# Patient Record
Sex: FEMALE | Race: WHITE | NOT HISPANIC OR LATINO | Employment: UNEMPLOYED | URBAN - METROPOLITAN AREA
[De-identification: names, ages, dates, MRNs, and addresses within clinical notes are randomized per-mention and may not be internally consistent; named-entity substitution may affect disease eponyms.]

---

## 2020-09-02 DIAGNOSIS — M79.671 BILATERAL FOOT PAIN: Primary | ICD-10-CM

## 2020-09-02 DIAGNOSIS — M79.672 BILATERAL FOOT PAIN: Primary | ICD-10-CM

## 2020-09-03 ENCOUNTER — OFFICE VISIT (OUTPATIENT)
Dept: ORTHOPEDICS | Facility: CLINIC | Age: 50
End: 2020-09-03
Payer: COMMERCIAL

## 2020-09-03 ENCOUNTER — HOSPITAL ENCOUNTER (OUTPATIENT)
Dept: RADIOLOGY | Facility: HOSPITAL | Age: 50
Discharge: HOME | End: 2020-09-03
Attending: ORTHOPAEDIC SURGERY
Payer: COMMERCIAL

## 2020-09-03 VITALS
HEART RATE: 86 BPM | SYSTOLIC BLOOD PRESSURE: 124 MMHG | DIASTOLIC BLOOD PRESSURE: 76 MMHG | BODY MASS INDEX: 26.21 KG/M2 | WEIGHT: 147.94 LBS | HEIGHT: 63 IN

## 2020-09-03 DIAGNOSIS — M20.10 HALLUX VALGUS WITH BUNIONS, UNSPECIFIED LATERALITY: Primary | ICD-10-CM

## 2020-09-03 DIAGNOSIS — M79.671 BILATERAL FOOT PAIN: ICD-10-CM

## 2020-09-03 DIAGNOSIS — M21.619 HALLUX VALGUS WITH BUNIONS, UNSPECIFIED LATERALITY: Primary | ICD-10-CM

## 2020-09-03 DIAGNOSIS — M79.672 BILATERAL FOOT PAIN: ICD-10-CM

## 2020-09-03 DIAGNOSIS — Z01.818 PREOP TESTING: ICD-10-CM

## 2020-09-03 PROCEDURE — 73630 XR FOOT COMPLETE 3 VIEW BILATERAL: ICD-10-PCS | Mod: 26,50,, | Performed by: RADIOLOGY

## 2020-09-03 PROCEDURE — 73630 X-RAY EXAM OF FOOT: CPT | Mod: TC,50,PO

## 2020-09-03 PROCEDURE — 99244 PR OFFICE CONSULTATION,LEVEL IV: ICD-10-PCS | Mod: S$GLB,,, | Performed by: ORTHOPAEDIC SURGERY

## 2020-09-03 PROCEDURE — 99999 PR PBB SHADOW E&M-EST. PATIENT-LVL III: CPT | Mod: PBBFAC,,, | Performed by: ORTHOPAEDIC SURGERY

## 2020-09-03 PROCEDURE — 73630 X-RAY EXAM OF FOOT: CPT | Mod: 26,50,, | Performed by: RADIOLOGY

## 2020-09-03 PROCEDURE — 99244 OFF/OP CNSLTJ NEW/EST MOD 40: CPT | Mod: S$GLB,,, | Performed by: ORTHOPAEDIC SURGERY

## 2020-09-03 PROCEDURE — 99999 PR PBB SHADOW E&M-EST. PATIENT-LVL III: ICD-10-PCS | Mod: PBBFAC,,, | Performed by: ORTHOPAEDIC SURGERY

## 2020-09-03 RX ORDER — TERBINAFINE HYDROCHLORIDE 250 MG/1
250 TABLET ORAL DAILY
COMMUNITY
End: 2021-07-21

## 2020-09-03 NOTE — PROGRESS NOTES
"HPI: Renata Bae is a  49 y.o. female  who was referred to me by Dr. Katherine Fofana and was seen in consultation today for bilateral foot pain, left is worse than the right.  No history of injury or trauma. The pain has been present and worsening for several years. She rates her pain as 5/10 today. The pain is worse with shoe wear and activity.   Pt denies weakness, numbness, and tingling.          PAST MEDICAL/SURGICAL/FAMILY/SOCIAL/ HISTORY: REVIEWED    ALLERGIES/MEDICATIONS: REVIEWED       Review of Systems:     Constitution: Negative.   HEENT: Negative.   Eyes: Negative.   Cardiovascular: Negative.   Respiratory: Negative.   Endocrine: Negative.   Hematologic/Lymphatic: Negative.   Skin: Negative.   Musculoskeletal: Positive for bilateral foot pain   Gastrointestinal: Negative.   Genitourinary: Negative.   Neurological: Negative.   Psychiatric/Behavioral: Negative.   Allergic/Immunologic: Negative.       PHYSICAL EXAM:  Vitals:    09/03/20 1347   BP: 124/76   Pulse: 86     Ht Readings from Last 1 Encounters:   09/03/20 5' 3" (1.6 m)     Wt Readings from Last 1 Encounters:   09/03/20 67.1 kg (147 lb 14.9 oz)       GENERAL: Well developed, well nourished, no acute distress. Very pleasant. ++Generalized ligamentous laxity.   SKIN: Skin is intact. No atrophy, abrasions or lesions are noted.   Neurological: Normal mental status. Appropriate and conversant. Alert and oriented x 3.  GAIT: Walks with non-antalgic gait.    Left  lower extremity compared with RLE:  2+ dorsalis pedis pulse.  Capillary refill < 3 seconds.  Normal range of motion tibiotalar and subtalar joints. Pes planus.  5/5 strength EHL, FHL, tibialis anterior, gastrocsoleus, tibialis posterior and peroneals. Sensation to light touch intact sural, saphenous, superficial peroneal and deep peroneal nerves. + swelling and  tenderness to palpation at the 1st mptj with large bunions and hallux valgus, L>>R. No ecchymosis. No lymphadenopathy, no " masses or tumors palpated.  Increased of the 1st mtpj. + increased motion at the 1st tmtj.     XRAYS:   3 views of bilateral feet obtained and reviewed today reveal bilateral hallux valgus with bunions. Left HVA 37.1  THA 17.7 , Right HVA 26  THA 12      ASSESSMENT: Bilateral hallux valgus with bunions      PLAN:  I spent 45 minutes in consulation with the patient today. More than half the time was spent counseling the patient on her condition and the options for operative versus non-operative care.    We discussed surgical treatment  for the patient's condition at length and in detail including post-operative course, complications, outcomes and prognosis using diagrams and models where appropriate. I recommend Left distal soft tissue release, bunionectomy and Lapidus fusion.  Informed consent was obtained, patient understands risks and benefits of procedure.  To OR 11/8/20.   We discussed higher risk of recurrence due to her generalized ligamentous laxity.   We also discussed the right would require distal chevron osteotomy and bunionectomy.

## 2020-09-03 NOTE — LETTER
September 4, 2020        Katherine Fofana, MYRNA  [address]  [facility]  [place] [address]             [facility]  [address]  [facility] [address]  Phone: [phone]   Patient: Renata Bae   MR Number: [id number]   YOB: 1970   Date of Visit: 9/3/2020       Dear Dr. Fofana:    Thank you for referring Renata Bae to me for evaluation. Attached you will find relevant portions of my assessment and plan of care.    If you have questions, please do not hesitate to call me. I look forward to following Renata Bae along with you.    Sincerely,      Braden Garibay MD            CC  No Recipients    Enclosure

## 2020-09-03 NOTE — NURSING
Instructed patient on preop instructions. Patient to stop taking any blood thinning medications at least seven days prior to scheduled surgery. Patient to not take any NSAIDS at least 7 days prior to surgery and will resume when Dr. Garibay instructs them to as it can delay bone healing. Nothing to eat/drink after midnight the night prior to surgery.   Patient instructed on postop care. Instructions included to remain non weight bearing until instructed otherwise by Dr. Garibay. Post op dressing can not get wet. If it gets wet patient to call office immediately or go to [facility] ER to have it replaced. Patient to keep affected limb elevated higher than the heart at all times after surgery to decrease swelling. Further instructions given to patient on preop/postop instruction handout. No NSAID form given to patient. Handout on knee scooter given to patient as well. Knee scooter to be ordered for patient to use after surgery. Covid test scheduled for 11/15/2020. Surgery scheduled for 11/18/2020. Patient verbalized understanding.

## 2020-09-04 ENCOUNTER — TELEPHONE (OUTPATIENT)
Dept: ORTHOPEDICS | Facility: CLINIC | Age: 50
End: 2020-09-04

## 2020-09-04 PROBLEM — M21.619 HALLUX VALGUS WITH BUNIONS: Status: ACTIVE | Noted: 2020-09-04

## 2020-09-04 PROBLEM — M20.10 HALLUX VALGUS WITH BUNIONS: Status: ACTIVE | Noted: 2020-09-04

## 2020-09-04 NOTE — TELEPHONE ENCOUNTER
Called pt back. She states that she would not be back in town on [place] 11/15/2020 to have the COVID test done for surgery on 11/18/2020. Pt also states that she decided to have surgery in October instead of November so that she is not in a lot of pain around Thanksgiving. Rescheduled surgery to 10/14/2020. COVID rescheduled to 10/11/2020. Lara Goddard

## 2020-09-04 NOTE — TELEPHONE ENCOUNTER
----- Message from Aaron Villalobos sent at 9/4/2020  9:13 AM CDT -----  Regarding: re-bonnie Dos Santos  Contact: pt

## 2020-09-30 RX ORDER — SODIUM CHLORIDE 9 MG/ML
INJECTION, SOLUTION INTRAVENOUS CONTINUOUS
Status: CANCELLED | OUTPATIENT
Start: 2020-09-30

## 2020-09-30 RX ORDER — MUPIROCIN 20 MG/G
OINTMENT TOPICAL
Status: CANCELLED | OUTPATIENT
Start: 2020-09-30

## 2020-10-03 ENCOUNTER — PATIENT MESSAGE (OUTPATIENT)
Dept: SURGERY | Facility: HOSPITAL | Age: 50
End: 2020-10-03

## 2020-10-07 ENCOUNTER — PATIENT MESSAGE (OUTPATIENT)
Dept: SURGERY | Facility: HOSPITAL | Age: 50
End: 2020-10-07

## 2020-10-07 DIAGNOSIS — M21.619 HALLUX VALGUS WITH BUNIONS, UNSPECIFIED LATERALITY: Primary | ICD-10-CM

## 2020-10-07 DIAGNOSIS — M20.10 HALLUX VALGUS WITH BUNIONS, UNSPECIFIED LATERALITY: Primary | ICD-10-CM

## 2020-10-11 ENCOUNTER — LAB VISIT (OUTPATIENT)
Dept: FAMILY MEDICINE | Facility: CLINIC | Age: 50
End: 2020-10-11
Payer: COMMERCIAL

## 2020-10-11 DIAGNOSIS — Z01.818 PREOP TESTING: ICD-10-CM

## 2020-10-11 PROCEDURE — U0003 INFECTIOUS AGENT DETECTION BY NUCLEIC ACID (DNA OR RNA); SEVERE ACUTE RESPIRATORY SYNDROME CORONAVIRUS 2 (SARS-COV-2) (CORONAVIRUS DISEASE [COVID-19]), AMPLIFIED PROBE TECHNIQUE, MAKING USE OF HIGH THROUGHPUT TECHNOLOGIES AS DESCRIBED BY CMS-2020-01-R: HCPCS

## 2020-10-12 LAB — SARS-COV-2 RNA RESP QL NAA+PROBE: NOT DETECTED

## 2020-10-12 RX ORDER — CHOLECALCIFEROL (VITAMIN D3) 25 MCG
4000 TABLET ORAL DAILY
COMMUNITY
End: 2022-08-16

## 2020-10-12 RX ORDER — MUPIROCIN 20 MG/G
OINTMENT TOPICAL 2 TIMES DAILY
COMMUNITY
End: 2021-07-21

## 2020-10-13 ENCOUNTER — ANESTHESIA EVENT (OUTPATIENT)
Dept: SURGERY | Facility: HOSPITAL | Age: 50
End: 2020-10-13
Payer: COMMERCIAL

## 2020-10-14 ENCOUNTER — HOSPITAL ENCOUNTER (OUTPATIENT)
Facility: HOSPITAL | Age: 50
Discharge: HOME | End: 2020-10-14
Attending: ORTHOPAEDIC SURGERY | Admitting: ORTHOPAEDIC SURGERY
Payer: COMMERCIAL

## 2020-10-14 ENCOUNTER — ANESTHESIA (OUTPATIENT)
Dept: SURGERY | Facility: HOSPITAL | Age: 50
End: 2020-10-14
Payer: COMMERCIAL

## 2020-10-14 ENCOUNTER — HOSPITAL ENCOUNTER (OUTPATIENT)
Dept: RADIOLOGY | Facility: HOSPITAL | Age: 50
Discharge: HOME | End: 2020-10-14
Attending: ORTHOPAEDIC SURGERY | Admitting: ORTHOPAEDIC SURGERY
Payer: COMMERCIAL

## 2020-10-14 DIAGNOSIS — M20.12 HALLUX VALGUS WITH BUNIONS OF LEFT FOOT: Primary | ICD-10-CM

## 2020-10-14 DIAGNOSIS — M21.612 HALLUX VALGUS WITH BUNIONS OF LEFT FOOT: Primary | ICD-10-CM

## 2020-10-14 DIAGNOSIS — M21.619: ICD-10-CM

## 2020-10-14 DIAGNOSIS — M20.10: ICD-10-CM

## 2020-10-14 DIAGNOSIS — M21.619 HALLUX VALGUS WITH BUNIONS, UNSPECIFIED LATERALITY: ICD-10-CM

## 2020-10-14 DIAGNOSIS — M20.10 HALLUX VALGUS WITH BUNIONS, UNSPECIFIED LATERALITY: ICD-10-CM

## 2020-10-14 LAB
25(OH)D3+25(OH)D2 SERPL-MCNC: 81 NG/ML (ref 30–96)
B-HCG UR QL: NEGATIVE
CTP QC/QA: YES

## 2020-10-14 PROCEDURE — 63600175 PHARM REV CODE 636 W HCPCS: Mod: PO | Performed by: ANESTHESIOLOGY

## 2020-10-14 PROCEDURE — 25000003 PHARM REV CODE 250: Mod: PO | Performed by: ANESTHESIOLOGY

## 2020-10-14 PROCEDURE — 36415 COLL VENOUS BLD VENIPUNCTURE: CPT | Mod: PO

## 2020-10-14 PROCEDURE — C1713 ANCHOR/SCREW BN/BN,TIS/BN: HCPCS | Mod: PO | Performed by: ORTHOPAEDIC SURGERY

## 2020-10-14 PROCEDURE — C9290 INJ, BUPIVACAINE LIPOSOME: HCPCS | Mod: PO | Performed by: ANESTHESIOLOGY

## 2020-10-14 PROCEDURE — 28740 FUSION OF FOOT BONES: CPT | Mod: LT,,, | Performed by: ORTHOPAEDIC SURGERY

## 2020-10-14 PROCEDURE — 81025 URINE PREGNANCY TEST: CPT | Mod: PO | Performed by: ORTHOPAEDIC SURGERY

## 2020-10-14 PROCEDURE — 37000008 HC ANESTHESIA 1ST 15 MINUTES: Mod: PO | Performed by: ORTHOPAEDIC SURGERY

## 2020-10-14 PROCEDURE — D9220A PRA ANESTHESIA: ICD-10-PCS | Mod: CRNA,,, | Performed by: NURSE ANESTHETIST, CERTIFIED REGISTERED

## 2020-10-14 PROCEDURE — 71000015 HC POSTOP RECOV 1ST HR: Mod: PO | Performed by: ORTHOPAEDIC SURGERY

## 2020-10-14 PROCEDURE — 36000708 HC OR TIME LEV III 1ST 15 MIN: Mod: PO | Performed by: ORTHOPAEDIC SURGERY

## 2020-10-14 PROCEDURE — 76942 PR U/S GUIDANCE FOR NEEDLE GUIDANCE: ICD-10-PCS | Mod: 26,,, | Performed by: ANESTHESIOLOGY

## 2020-10-14 PROCEDURE — 76000 FLUOROSCOPY <1 HR PHYS/QHP: CPT | Mod: TC,PO,59

## 2020-10-14 PROCEDURE — 64450 PR NERVE BLOCK INJ, ANES/STEROID, OTHER PERIPHERAL: ICD-10-PCS | Mod: 59,LT,, | Performed by: ANESTHESIOLOGY

## 2020-10-14 PROCEDURE — 28292 COR HLX VLGS RSC PRX PHLX BS: CPT | Mod: 51,LT,, | Performed by: ORTHOPAEDIC SURGERY

## 2020-10-14 PROCEDURE — 71000033 HC RECOVERY, INTIAL HOUR: Mod: PO | Performed by: ORTHOPAEDIC SURGERY

## 2020-10-14 PROCEDURE — 71000039 HC RECOVERY, EACH ADD'L HOUR: Mod: PO | Performed by: ORTHOPAEDIC SURGERY

## 2020-10-14 PROCEDURE — 63600175 PHARM REV CODE 636 W HCPCS: Mod: PO | Performed by: NURSE ANESTHETIST, CERTIFIED REGISTERED

## 2020-10-14 PROCEDURE — 28740 PR FUSION FOOT BONE,MIDTARSAL,1 JT: ICD-10-PCS | Mod: LT,,, | Performed by: ORTHOPAEDIC SURGERY

## 2020-10-14 PROCEDURE — D9220A PRA ANESTHESIA: Mod: ANES,,, | Performed by: ANESTHESIOLOGY

## 2020-10-14 PROCEDURE — 28292 PR CORRECT BUNION, INCL W/RES OF PROX PHALANX BASE, ANY METHOD: ICD-10-PCS | Mod: 51,LT,, | Performed by: ORTHOPAEDIC SURGERY

## 2020-10-14 PROCEDURE — 27800903 OPTIME MED/SURG SUP & DEVICES OTHER IMPLANTS: Mod: PO | Performed by: ORTHOPAEDIC SURGERY

## 2020-10-14 PROCEDURE — C1769 GUIDE WIRE: HCPCS | Mod: PO | Performed by: ORTHOPAEDIC SURGERY

## 2020-10-14 PROCEDURE — 82306 VITAMIN D 25 HYDROXY: CPT

## 2020-10-14 PROCEDURE — D9220A PRA ANESTHESIA: Mod: CRNA,,, | Performed by: NURSE ANESTHETIST, CERTIFIED REGISTERED

## 2020-10-14 PROCEDURE — 25000003 PHARM REV CODE 250: Mod: PO | Performed by: ORTHOPAEDIC SURGERY

## 2020-10-14 PROCEDURE — 76942 ECHO GUIDE FOR BIOPSY: CPT | Mod: PO,59 | Performed by: ANESTHESIOLOGY

## 2020-10-14 PROCEDURE — D9220A PRA ANESTHESIA: ICD-10-PCS | Mod: ANES,,, | Performed by: ANESTHESIOLOGY

## 2020-10-14 PROCEDURE — 76942 ECHO GUIDE FOR BIOPSY: CPT | Mod: 26,,, | Performed by: ANESTHESIOLOGY

## 2020-10-14 PROCEDURE — 25000003 PHARM REV CODE 250: Mod: PO | Performed by: NURSE ANESTHETIST, CERTIFIED REGISTERED

## 2020-10-14 PROCEDURE — 27201423 OPTIME MED/SURG SUP & DEVICES STERILE SUPPLY: Mod: PO | Performed by: ORTHOPAEDIC SURGERY

## 2020-10-14 PROCEDURE — S0020 INJECTION, BUPIVICAINE HYDRO: HCPCS | Mod: PO | Performed by: ANESTHESIOLOGY

## 2020-10-14 PROCEDURE — 64445 NJX AA&/STRD SCIATIC NRV IMG: CPT | Mod: PO | Performed by: ANESTHESIOLOGY

## 2020-10-14 PROCEDURE — 64450 NJX AA&/STRD OTHER PN/BRANCH: CPT | Mod: 59,LT,, | Performed by: ANESTHESIOLOGY

## 2020-10-14 PROCEDURE — 37000009 HC ANESTHESIA EA ADD 15 MINS: Mod: PO | Performed by: ORTHOPAEDIC SURGERY

## 2020-10-14 PROCEDURE — 36000709 HC OR TIME LEV III EA ADD 15 MIN: Mod: PO | Performed by: ORTHOPAEDIC SURGERY

## 2020-10-14 DEVICE — ANCHOR JUGGERKNOT RIG SH SZ1: Type: IMPLANTABLE DEVICE | Site: FOOT | Status: FUNCTIONAL

## 2020-10-14 RX ORDER — ONDANSETRON 2 MG/ML
INJECTION INTRAMUSCULAR; INTRAVENOUS
Status: DISCONTINUED | OUTPATIENT
Start: 2020-10-14 | End: 2020-10-14

## 2020-10-14 RX ORDER — FENTANYL CITRATE 50 UG/ML
50 INJECTION, SOLUTION INTRAMUSCULAR; INTRAVENOUS ONCE
Status: COMPLETED | OUTPATIENT
Start: 2020-10-14 | End: 2020-10-14

## 2020-10-14 RX ORDER — ONDANSETRON 4 MG/1
8 TABLET, ORALLY DISINTEGRATING ORAL EVERY 8 HOURS PRN
Qty: 6 TABLET | Refills: 0 | Status: SHIPPED | OUTPATIENT
Start: 2020-10-14 | End: 2021-07-21

## 2020-10-14 RX ORDER — SODIUM CHLORIDE 9 MG/ML
INJECTION, SOLUTION INTRAVENOUS CONTINUOUS
Status: DISCONTINUED | OUTPATIENT
Start: 2020-10-14 | End: 2020-10-14 | Stop reason: HOSPADM

## 2020-10-14 RX ORDER — LIDOCAINE HYDROCHLORIDE 20 MG/ML
INJECTION INTRAVENOUS
Status: DISCONTINUED | OUTPATIENT
Start: 2020-10-14 | End: 2020-10-14

## 2020-10-14 RX ORDER — MUPIROCIN 20 MG/G
OINTMENT TOPICAL
Status: DISCONTINUED | OUTPATIENT
Start: 2020-10-14 | End: 2020-10-14 | Stop reason: HOSPADM

## 2020-10-14 RX ORDER — HYDROMORPHONE HYDROCHLORIDE 2 MG/ML
0.2 INJECTION, SOLUTION INTRAMUSCULAR; INTRAVENOUS; SUBCUTANEOUS EVERY 5 MIN PRN
Status: DISCONTINUED | OUTPATIENT
Start: 2020-10-14 | End: 2020-10-14 | Stop reason: HOSPADM

## 2020-10-14 RX ORDER — PROPOFOL 10 MG/ML
VIAL (ML) INTRAVENOUS
Status: DISCONTINUED | OUTPATIENT
Start: 2020-10-14 | End: 2020-10-14

## 2020-10-14 RX ORDER — BUPIVACAINE HYDROCHLORIDE 5 MG/ML
INJECTION, SOLUTION EPIDURAL; INTRACAUDAL
Status: DISCONTINUED | OUTPATIENT
Start: 2020-10-14 | End: 2020-10-14

## 2020-10-14 RX ORDER — OXYCODONE HYDROCHLORIDE 5 MG/1
5 TABLET ORAL
Status: DISCONTINUED | OUTPATIENT
Start: 2020-10-14 | End: 2020-10-14 | Stop reason: HOSPADM

## 2020-10-14 RX ORDER — NEOSTIGMINE METHYLSULFATE 1 MG/ML
INJECTION, SOLUTION INTRAVENOUS
Status: DISCONTINUED | OUTPATIENT
Start: 2020-10-14 | End: 2020-10-14

## 2020-10-14 RX ORDER — FENTANYL CITRATE 50 UG/ML
25 INJECTION, SOLUTION INTRAMUSCULAR; INTRAVENOUS EVERY 5 MIN PRN
Status: DISCONTINUED | OUTPATIENT
Start: 2020-10-14 | End: 2020-10-14 | Stop reason: HOSPADM

## 2020-10-14 RX ORDER — LIDOCAINE HYDROCHLORIDE 10 MG/ML
1 INJECTION, SOLUTION EPIDURAL; INFILTRATION; INTRACAUDAL; PERINEURAL ONCE
Status: DISCONTINUED | OUTPATIENT
Start: 2020-10-14 | End: 2020-10-14 | Stop reason: HOSPADM

## 2020-10-14 RX ORDER — ROCURONIUM BROMIDE 10 MG/ML
INJECTION, SOLUTION INTRAVENOUS
Status: DISCONTINUED | OUTPATIENT
Start: 2020-10-14 | End: 2020-10-14

## 2020-10-14 RX ORDER — OXYCODONE AND ACETAMINOPHEN 10; 325 MG/1; MG/1
1 TABLET ORAL EVERY 4 HOURS PRN
Qty: 24 TABLET | Refills: 0 | Status: SHIPPED | OUTPATIENT
Start: 2020-10-14 | End: 2021-07-21

## 2020-10-14 RX ORDER — DEXAMETHASONE SODIUM PHOSPHATE 4 MG/ML
INJECTION, SOLUTION INTRA-ARTICULAR; INTRALESIONAL; INTRAMUSCULAR; INTRAVENOUS; SOFT TISSUE
Status: DISCONTINUED | OUTPATIENT
Start: 2020-10-14 | End: 2020-10-14

## 2020-10-14 RX ORDER — MIDAZOLAM HYDROCHLORIDE 1 MG/ML
2 INJECTION INTRAMUSCULAR; INTRAVENOUS ONCE
Status: COMPLETED | OUTPATIENT
Start: 2020-10-14 | End: 2020-10-14

## 2020-10-14 RX ORDER — SODIUM CHLORIDE, SODIUM LACTATE, POTASSIUM CHLORIDE, CALCIUM CHLORIDE 600; 310; 30; 20 MG/100ML; MG/100ML; MG/100ML; MG/100ML
INJECTION, SOLUTION INTRAVENOUS CONTINUOUS
Status: DISCONTINUED | OUTPATIENT
Start: 2020-10-14 | End: 2020-10-14 | Stop reason: HOSPADM

## 2020-10-14 RX ORDER — FENTANYL CITRATE 50 UG/ML
INJECTION, SOLUTION INTRAMUSCULAR; INTRAVENOUS
Status: DISCONTINUED | OUTPATIENT
Start: 2020-10-14 | End: 2020-10-14

## 2020-10-14 RX ORDER — EPHEDRINE SULFATE 50 MG/ML
INJECTION, SOLUTION INTRAVENOUS
Status: DISCONTINUED | OUTPATIENT
Start: 2020-10-14 | End: 2020-10-14

## 2020-10-14 RX ADMIN — FENTANYL CITRATE 50 MCG: 50 INJECTION, SOLUTION INTRAMUSCULAR; INTRAVENOUS at 06:10

## 2020-10-14 RX ADMIN — FENTANYL CITRATE 50 MCG: 50 INJECTION, SOLUTION INTRAMUSCULAR; INTRAVENOUS at 07:10

## 2020-10-14 RX ADMIN — FENTANYL CITRATE 50 MCG: 50 INJECTION, SOLUTION INTRAMUSCULAR; INTRAVENOUS at 08:10

## 2020-10-14 RX ADMIN — MUPIROCIN: 20 OINTMENT TOPICAL at 06:10

## 2020-10-14 RX ADMIN — LIDOCAINE HYDROCHLORIDE 100 MG: 20 INJECTION, SOLUTION INTRAVENOUS at 07:10

## 2020-10-14 RX ADMIN — EPHEDRINE SULFATE 10 MG: 50 INJECTION INTRAVENOUS at 07:10

## 2020-10-14 RX ADMIN — SODIUM CHLORIDE, SODIUM LACTATE, POTASSIUM CHLORIDE, AND CALCIUM CHLORIDE: .6; .31; .03; .02 INJECTION, SOLUTION INTRAVENOUS at 06:10

## 2020-10-14 RX ADMIN — SODIUM CHLORIDE, SODIUM LACTATE, POTASSIUM CHLORIDE, AND CALCIUM CHLORIDE: .6; .31; .03; .02 INJECTION, SOLUTION INTRAVENOUS at 07:10

## 2020-10-14 RX ADMIN — OXYCODONE 5 MG: 5 TABLET ORAL at 09:10

## 2020-10-14 RX ADMIN — FENTANYL CITRATE 25 MCG: 50 INJECTION, SOLUTION INTRAMUSCULAR; INTRAVENOUS at 09:10

## 2020-10-14 RX ADMIN — ROCURONIUM BROMIDE 30 MG: 10 INJECTION, SOLUTION INTRAVENOUS at 07:10

## 2020-10-14 RX ADMIN — BUPIVACAINE 10 ML: 13.3 INJECTION, SUSPENSION, LIPOSOMAL INFILTRATION at 06:10

## 2020-10-14 RX ADMIN — PROPOFOL 200 MG: 10 INJECTION, EMULSION INTRAVENOUS at 07:10

## 2020-10-14 RX ADMIN — DEXAMETHASONE SODIUM PHOSPHATE 4 MG: 4 INJECTION, SOLUTION INTRAMUSCULAR; INTRAVENOUS at 07:10

## 2020-10-14 RX ADMIN — MIDAZOLAM 2 MG: 1 INJECTION INTRAMUSCULAR; INTRAVENOUS at 06:10

## 2020-10-14 RX ADMIN — ONDANSETRON 4 MG: 2 INJECTION, SOLUTION INTRAMUSCULAR; INTRAVENOUS at 08:10

## 2020-10-14 RX ADMIN — BUPIVACAINE HYDROCHLORIDE 10 ML: 5 INJECTION, SOLUTION EPIDURAL; INTRACAUDAL; PERINEURAL at 06:10

## 2020-10-14 RX ADMIN — NEOSTIGMINE METHYLSULFATE 5 MG: 1 INJECTION INTRAVENOUS at 08:10

## 2020-10-14 NOTE — BRIEF OP NOTE
OPERATIVE NOTE      DATE:10/14/2020   TIME: 8:36 AM      PATIENT NAME: Renata Bae      PRE-OPERATIVE DIAGNOSIS: Left  hallux valgus with bunion     POST-OPERATIVE DIAGNOSIS:  Left Right hallux valgus with bunion     PROCEDURE: 1) Left bunionectomy and modified De Los Santos distal soft tissue release 2) Left [id number] tarsometatarsal joint arthrodesis      SURGEON: Braden Garibay MD     ANESTHESIA TYPE: GETA      SPECIMENS SENT: NONE      COMPLICATIONS: NONE      BLOOD LOSS: 10 cc      ASSISTANT: MEENU Murry

## 2020-10-14 NOTE — ANESTHESIA PROCEDURE NOTES
Peripheral Block    Patient location during procedure: pre-op   Block not for primary anesthetic.  Reason for block: at surgeon's request and post-op pain management   Post-op Pain Location: left foot  Start time: 10/14/2020 6:44 AM  Timeout: 10/14/2020 6:44 AM   End time: 10/14/2020 6:50 AM    Staffing  Authorizing Provider: Sly Tuttle MD  Performing Provider: Sly Tuttle MD    Preanesthetic Checklist  Completed: patient identified, site marked, surgical consent, pre-op evaluation, timeout performed, IV checked, risks and benefits discussed and monitors and equipment checked  Peripheral Block  Patient position: right lateral decubitus  Prep: ChloraPrep  Patient monitoring: heart rate, cardiac monitor, continuous pulse ox, continuous capnometry and frequent blood pressure checks  Block type: popliteal  Laterality: left  Injection technique: single shot  Needle  Needle type: Stimuplex   Needle gauge: 21 G  Needle length: 4 in  Needle localization: anatomical landmarks and ultrasound guidance   -ultrasound image captured on disc.  Assessment  Injection assessment: negative aspiration, negative parasthesia and local visualized surrounding nerve  Paresthesia pain: none  Heart rate change: no  Slow fractionated injection: yes  Additional Notes  VSS.  [facility] RN monitoring vitals throughout procedure.  Patient tolerated procedure well.Exparel 10ml, 133mg and Bup 0.5% - 10ml.  Note - separate tibial and common peroneal components - each blocked separately.  No complications.

## 2020-10-14 NOTE — ANESTHESIA PREPROCEDURE EVALUATION
10/14/2020  Renata Bae is a 49 y.o., female.    Anesthesia Evaluation    I have reviewed the Patient Summary Reports.    I have reviewed the Nursing Notes. I have reviewed the NPO Status.   I have reviewed the Medications.     Review of Systems  Cardiovascular:  Cardiovascular Normal     Pulmonary:  Pulmonary Normal    Renal/:  Renal/ Normal     Hepatic/GI:  Hepatic/GI Normal    Musculoskeletal:  Musculoskeletal Normal Foot disorder   Neurological:  Neurology Normal    Endocrine:   Hypothyroidism Hashimoto's   Psych:  Psychiatric Normal           Physical Exam  General:  Well nourished    Airway/Jaw/Neck:  Airway Findings: Mouth Opening: Normal Tongue: Normal  General Airway Assessment: Adult  Mallampati: III  Improves to II with phonation.      Dental:  Dental Findings: In tact   Chest/Lungs:  Chest/Lungs Findings: Clear to auscultation, Normal Respiratory Rate     Heart/Vascular:  Heart Findings: Rate: Normal  Rhythm: Regular Rhythm        Mental Status:  Mental Status Findings:  Cooperative, Alert and Oriented         Anesthesia Plan  Type of Anesthesia, risks & benefits discussed:  Anesthesia Type:  general  Patient's Preference:   Intra-op Monitoring Plan: standard ASA monitors  Intra-op Monitoring Plan Comments:   Post Op Pain Control Plan: multimodal analgesia, IV/PO Opioids PRN and peripheral nerve block  Post Op Pain Control Plan Comments:   Induction:   IV and Inhalation  Beta Blocker:  Patient is not currently on a Beta-Blocker (No further documentation required).       Informed Consent: Patient understands risks and agrees with Anesthesia plan.  Questions answered. Anesthesia consent signed with patient.  ASA Score: 2     Day of Surgery Review of History & Physical:            Ready For Surgery From Anesthesia Perspective.

## 2020-10-14 NOTE — DISCHARGE SUMMARY
"[facility]  Discharge Note  Short Stay    Procedure(s) (LRB):  BUNIONECTOMY, LAPIDUS (Left)    OUTCOME: Patient tolerated treatment/procedure well without complication and is now ready for discharge.    DISPOSITION: Home or Self Care    FINAL DIAGNOSIS:  Hallux valgus with bunions    FOLLOWUP: In clinic on 10/29/20    DISCHARGE INSTRUCTIONS:    Discharge Procedure Orders   CRUTCHES FOR HOME USE     Order Specific Question Answer Comments   Type: Axillary    Height: 5' 3" (1.6 m)    Weight: 63.5 kg (140 lb)    Length of need (1-99 months): 1      Diet general     Call MD for:  temperature >100.4     Call MD for:  persistent nausea and vomiting     Call MD for:  severe uncontrolled pain     Call MD for:  difficulty breathing, headache or visual disturbances     Call MD for:  redness, tenderness, or signs of infection (pain, swelling, redness, odor or green/yellow discharge around incision site)     Call MD for:  hives     Call MD for:  persistent dizziness or light-headedness     Call MD for:  extreme fatigue     Keep surgical extremity elevated     No driving, operating heavy equipment or signing legal documents while taking pain medication     Leave dressing on - Keep it clean, dry, and intact until clinic visit     Non weight bearing         "

## 2020-10-14 NOTE — ANESTHESIA PROCEDURE NOTES
Intubation  Performed by: Elsa Haji CRNA  Authorized by: Sly Tuttle MD     Intubation:     Induction:  Intravenous    Intubated:  Postinduction    Mask Ventilation:  Easy mask    Attempts:  1    Attempted By:  CRNA    Method of Intubation:  Direct    Blade:  Petersen 2    Laryngeal View Grade: Grade I - full view of chords      Difficult Airway Encountered?: No      Complications:  None    Airway Device:  Oral endotracheal tube    Airway Device Size:  7.0    Style/Cuff Inflation:  Cuffed (inflated to minimal occlusive pressure)    Tube secured:  21    Placement Verified By:  Capnometry    Complicating Factors:  None    Findings Post-Intubation:  BS equal bilateral

## 2020-10-14 NOTE — OP NOTE
OPERATIVE NOTE      DATE:10/14/2020   TIME: 8:36 AM      PATIENT NAME: Renata Bae      PRE-OPERATIVE DIAGNOSIS: Left  hallux valgus with bunion     POST-OPERATIVE DIAGNOSIS:  Left Right hallux valgus with bunion     PROCEDURE: 1) Left bunionectomy and modified De Los Santos distal soft tissue release 2) Left [id number] tarsometatarsal joint arthrodesis      SURGEON: Braden Garibay MD     ANESTHESIA TYPE: GETA      SPECIMENS SENT: NONE      COMPLICATIONS: NONE      BLOOD LOSS: 10 cc      ASSISTANT: MEENU Murry       Procedure in detail:  After appropriate informed consent was obtained the patient was taken to the OR and placed in the supine position  on the operating table. The left lower extremity was prepped and draped in the usual sterile fashion. The calf tourniquet was raised to 250 mmHg after esmarch exsanguination of the limb. Approximately, 2-cm incision was made longitudinally using a #15 blade overlying the second  webspace on the dorsum of the foot.  Seine rake retractors were placed and Littler scissors were used to dissect down to the level of the adductor tendon which was released using sharp dissection. The sesamoid was also identified and released from the surrounding scar tissue.  The lateral capsule of the first metatarsalphalangeal joint  was also released in order to complete the distal soft tissue release. Adequate release was confirmed with squeeze test.       Attention was then turned to the medial aspect of the great toe where an approximately 10 cm dorsomedial incision was made inline with the previous old incision using a #15 blade.  The capsule of the 1st metatarsalphalangeal joint was incised in a longitudinal fashion and retracted using seine rake.  The medial eminence was excised using a sagittal saw.  There was noted to be some early degenerative changes on the medial aspect of the head of the 1st metatarsal.  Next the 1st tarsometatarsal joint was dissected out and joint  distractor was applied. The 1st tarsometatarsal joint was decorticated using sagittal saw,  osteotomes, curettes, and camilla.  A montanez clamp was placed around the 2nd metatarsal neck and into the distal aspect of the 1st metatarsal and used to reduce and correct the deformity.      Next a 12 mm Crossroads staple was placed at the dorsal lateral aspect of the 1st  tarsometatarsal joint. Next a Crossroads 2-hole staple plate was placed on the dorsomedial aspect of the 1st  tarsometatarsal joint.  Next the plate was aligned with joint and then two olive wires were placed to hold the plate in position and one 3.0 mm non-locking screw was placed distally and then the compression staple was pre-drilled and placed across the joint with excellent compression of the 1st tarsometatarsal joint. One additional 3.5 mm non-locking screw was placed proximally in order to allow for continuous compression of the joint. AP/Lateral/Oblique views of the foot under fluoroscopy showed good alignment and good position of the staples, plate, and screws.     The incisions were irrigated with normal saline. The tourniquet was let down, adequate hemostasis was achieved using bovie cautery. The medial capsulorraphy was performed using 0 monocryl bioabsorbable interrupted sutures which were passed through two small drill holes in the distal 1st metatarsal neck and then passed through the capsule which was then tied down to the bone with the toe in neutral alignment correcting his pronation of the hallux as well.   The deep layer of the incisions were re-approximated using 2.0-monocyrl in an interrupted fashion. The subcutaneous layers was re-approximated using 3.0-monocyrl in an interrupted fashion. The skin was re-approximated using 3.0 nylon in a running fashion.  Sterile dressing using xeroform, bacitracin, 4x8s, cast padding, posterior splint and stirrups were placed. Patient tolerated the procedure well without complications.  I was present  and scrubbed for the entire case.

## 2020-10-14 NOTE — TRANSFER OF CARE
"Anesthesia Transfer of Care Note    Patient: Renata Bae    Procedure(s) Performed: Procedure(s) (LRB):  BUNIONECTOMY, LAPIDUS (Left)    Patient location: PACU    Anesthesia Type: general and regional    Transport from OR: Transported from OR on room air with adequate spontaneous ventilation    Post pain: adequate analgesia    Post assessment: no apparent anesthetic complications    Post vital signs: stable    Level of consciousness: awake    Nausea/Vomiting: no nausea/vomiting    Complications: none    Transfer of care protocol was followed      Last vitals:   Visit Vitals  BP (!) 107/57 (BP Location: Left arm, Patient Position: Lying)   Pulse 75   Temp 36.7 °C (98.1 °F) (Skin)   Resp 18   Ht 5' 3" (1.6 m)   Wt 63.5 kg (140 lb)   LMP 10/12/2020   SpO2 100%   Breastfeeding No   BMI 24.80 kg/m²     "

## 2020-10-14 NOTE — DISCHARGE INSTRUCTIONS
FOOT SURGERY  After surgery:    DOS:   Keep leg elevated until first post operative visit   Keep dressing clean and dry DO NOT CHANGE BANDAGES   Advance diet as tolerated.    Check circulation frequently in toes by pressing down on toenail. Nail should turn white and then pink WITHIN 3 SECONDS when released.   No weightbearing to surgical foot.   Resume home medications    DONT:   Do not remove your dressing   Do not get dressing wet.   No driving until released by MD   Do Not take Motrin/Ibuprofen until approved by doctor.   DO NOT TAKE ADDITIONAL TYLENOL/ACETAMINOPHEN WHILE TAKING NARCOTIC PAIN MEDICATION THAT CONTAINS TYLENOL/ACETAMINOPHEN.    CALL PHYSICIAN FOR:   Burning, or numbness of the toes not relieved by elevation of the leg.   Pale or cold toes; bluish nail beds.   Redness, swelling, or bleeding.   Fever> 101   Drainage (pus) from the operative sites   Pain unrelieved by pain medication    FOR EMERGENCIES CONTACT YOUR PHYSICIAN'S OFFICE      Exparel(bupivacaine) has been injected to provide approximately 72 hours of reduced pain after your surgery.  Do not remove the bracelet for five days  Report to your doctor as soon as possible the following:   Restlessness   Anxiety   Speech problems,    Lightheadedness   Numbness and tingling of the mouth and lips   Seizures    Metallic taste   Blurred vision   Tremors    Twitching   Depression   Extreme drowsiness  Avoid additional use of local anesthetics (such as dental procedures) for five days (96 hours)       Post Operative Instructions    Cast:     You will have a splint on the leg following surgery    The splint will remain in place for  2 weeks    You will be non-weightbearing in the splint and cast for 4-6 weeks    Wound:    The surgical incision has been closed with sutures    Do not get the dressing/splint wet and do not remove the dressing/splint for 2 weeks. When showering place a bag over the dressing and secure with tape or  rubberband to your leg to avoid the cast and wound getting wet and still put the leg out of the shower      Do not remove the dressing until your 2 week appointment - the first dressing change will occur at your 2 week appointment    Stitches will be removed at your 2 week appointment if the incision is healed    A cast will be applied at your 2 week visit    Once the cast is removed at 4-6 weeks you will begin Weight bearing as tolerated in a CAM Boot    You can shower and wash the foot once you are in the boot    You do not have to sleep in the boot    Do not immerse the foot in water (bath, hot tub, pool, lake, pond, river, ocean) for 4 weeks    You will apply scar cream and pain cream together to the incisions once in the boot    Weight Bearing:    You will be non-weight bearing for the first two weeks. You will be given crutches and knee scooter or wheelchair     You will be non-weightbearing for 6-8 weeks depending on how your bones are healing.     After 4-6 weeks you may begin fully walking on the foot if the bones are healed    Medications:    You will be given a prescription for pain medication     Pain medication should be used regularly for the first 24-48 hours, when required for the first 1 to 2 weeks, followed by Regular Tylenol     Driving:     For right foot surgery you are not permitted to drive for 8-10 weeks    For left foot surgery, please contact your insurance company to see if you are permitted to drive    Driving is not permitted while on narcotics    Work:    Two weeks off work is recommended for initial recovery    If you are able to get to work safely, and will be seated with the foot elevated for the majority of the day, you may return to work a couple days after surgery. This is assuming you are not taking narcotic pain mediation.    From 2-6 weeks sedentary duties is recommended    By 10-12 weeks you can slowly return to your normal duties    If your job is physically demanding, return  to full duties is usually possible around 12 weeks post operatively    Follow Up:    You will have your first appointment 2 weeks after surgery in the Clinic    You will have follow up appointments every 2 weeks until you get into the boot    Recovery:    It is normal to experience mild to moderate pain, numbness, or tingling for the first 2 weeks following surgery    Please come to the emergency department if you are suffering from severe pain    You will get back to most of your activities by 3-6 months    Swelling often remains for 6-12 months    You are expected to experience a maximal improvement from surgery in 9-12 months    Physiotherapy:    Formal physiotherapy is necessary and will usually be twice a week for 4-6 weeks    Do not remove your dressing or let anyone else including a physician remove your dressing unless otherwise instructed by Dr. Garibay. You may over wrap the dressing if there is any blood or fluid oozing through the ace bandage.

## 2020-10-14 NOTE — PLAN OF CARE
Resting in bed with eyes closed.Pt c/o slight nausea. Cool towel on forehead. Refuses Phenergan. NAD noted. States not ready to be discharged yet. States pain at manageable level.

## 2020-10-14 NOTE — ANESTHESIA POSTPROCEDURE EVALUATION
Anesthesia Post Evaluation    Patient: Renata Bae    Procedure(s) Performed: Procedure(s) (LRB):  BUNIONECTOMY, LAPIDUS (Left)    Final Anesthesia Type: general    Patient location during evaluation: PACU  Patient participation: Yes- Able to Participate  Level of consciousness: sedated and awake  Post-procedure vital signs: reviewed and stable  Pain management: adequate  Airway patency: patent    PONV status at discharge: No PONV  Anesthetic complications: no      Cardiovascular status: blood pressure returned to baseline  Respiratory status: spontaneous ventilation  Hydration status: euvolemic  Follow-up not needed.          Vitals Value Taken Time   /64 10/14/20 0902   Temp 36.6 °C (97.9 °F) 10/14/20 0847   Pulse 90 10/14/20 0902   Resp 16 10/14/20 0902   SpO2 96 % 10/14/20 0902         No case tracking events are documented in the log.      Pain/Jarad Score: Pain Rating Prior to Med Admin: 0 (10/14/2020  6:46 AM)  Jarad Score: 8 (10/14/2020  8:57 AM)

## 2020-10-14 NOTE — ADDENDUM NOTE
Addendum  created 10/14/20 0921 by Elsa Haji, CRNA    Child order released for a procedure order, Clinical Note Signed, Intraprocedure Blocks edited, Intraprocedure Event edited, LDA created via procedure documentation

## 2020-10-14 NOTE — H&P
"HPI: Renata Bae is a  49 y.o. female  who was referred to me by Dr. Katherine Fofana and was seen in consultation today for bilateral foot pain, left is worse than the right.  No history of injury or trauma. The pain has been present and worsening for several years. She rates her pain as 5/10 today. The pain is worse with shoe wear and activity.   Pt denies weakness, numbness, and tingling.           PAST MEDICAL/SURGICAL/FAMILY/SOCIAL/ HISTORY: REVIEWED    ALLERGIES/MEDICATIONS: REVIEWED         Review of Systems:     Constitution: Negative.   HEENT: Negative.   Eyes: Negative.   Cardiovascular: Negative.   Respiratory: Negative.   Endocrine: Negative.   Hematologic/Lymphatic: Negative.   Skin: Negative.   Musculoskeletal: Positive for bilateral foot pain   Gastrointestinal: Negative.   Genitourinary: Negative.   Neurological: Negative.   Psychiatric/Behavioral: Negative.   Allergic/Immunologic: Negative.         PHYSICAL EXAM:      Vitals:     09/03/20 1347   BP: 124/76   Pulse: 86          Ht Readings from Last 1 Encounters:   09/03/20 5' 3" (1.6 m)          Wt Readings from Last 1 Encounters:   09/03/20 67.1 kg (147 lb 14.9 oz)        GENERAL: Well developed, well nourished, no acute distress. Very pleasant. ++Generalized ligamentous laxity.   SKIN: Skin is intact. No atrophy, abrasions or lesions are noted.   Neurological: Normal mental status. Appropriate and conversant. Alert and oriented x 3.  GAIT: Walks with non-antalgic gait.     Left  lower extremity compared with RLE:  2+ dorsalis pedis pulse.  Capillary refill < 3 seconds.  Normal range of motion tibiotalar and subtalar joints. Pes planus.  5/5 strength EHL, FHL, tibialis anterior, gastrocsoleus, tibialis posterior and peroneals. Sensation to light touch intact sural, saphenous, superficial peroneal and deep peroneal nerves. + swelling and  tenderness to palpation at the 1st mptj with large bunions and hallux valgus, L>>R. No ecchymosis. No " lymphadenopathy, no masses or tumors palpated.  Increased of the 1st mtpj. + increased motion at the 1st tmtj.      XRAYS:   3 views of bilateral feet obtained and reviewed today reveal bilateral hallux valgus with bunions. Left HVA 37.1  THA 17.7 , Right HVA 26  THA 12        ASSESSMENT: Bilateral hallux valgus with bunions       PLAN:  I spent 45 minutes in consulation with the patient today. More than half the time was spent counseling the patient on her condition and the options for operative versus non-operative care.    We discussed surgical treatment  for the patient's condition at length and in detail including post-operative course, complications, outcomes and prognosis using diagrams and models where appropriate. I recommend Left distal soft tissue release, bunionectomy and Lapidus fusion.  Informed consent was obtained, patient understands risks and benefits of procedure.  To OR 11/8/20.   We discussed higher risk of recurrence due to her generalized ligamentous laxity.   We also discussed the right would require distal chevron osteotomy and bunionectomy.

## 2020-10-15 VITALS
HEIGHT: 63 IN | HEART RATE: 61 BPM | RESPIRATION RATE: 18 BRPM | DIASTOLIC BLOOD PRESSURE: 67 MMHG | SYSTOLIC BLOOD PRESSURE: 119 MMHG | WEIGHT: 140 LBS | BODY MASS INDEX: 24.8 KG/M2 | OXYGEN SATURATION: 97 % | TEMPERATURE: 98 F

## 2020-10-28 DIAGNOSIS — M21.619 HALLUX VALGUS WITH BUNIONS, UNSPECIFIED LATERALITY: Primary | ICD-10-CM

## 2020-10-28 DIAGNOSIS — M20.10 HALLUX VALGUS WITH BUNIONS, UNSPECIFIED LATERALITY: Primary | ICD-10-CM

## 2020-10-29 ENCOUNTER — HOSPITAL ENCOUNTER (OUTPATIENT)
Dept: RADIOLOGY | Facility: HOSPITAL | Age: 50
Discharge: HOME | End: 2020-10-29
Attending: ORTHOPAEDIC SURGERY
Payer: COMMERCIAL

## 2020-10-29 ENCOUNTER — OFFICE VISIT (OUTPATIENT)
Dept: ORTHOPEDICS | Facility: CLINIC | Age: 50
End: 2020-10-29
Payer: COMMERCIAL

## 2020-10-29 VITALS
DIASTOLIC BLOOD PRESSURE: 73 MMHG | HEART RATE: 97 BPM | WEIGHT: 140 LBS | BODY MASS INDEX: 24.8 KG/M2 | SYSTOLIC BLOOD PRESSURE: 119 MMHG | HEIGHT: 63 IN

## 2020-10-29 DIAGNOSIS — M20.12 HALLUX VALGUS WITH BUNIONS OF LEFT FOOT: Primary | ICD-10-CM

## 2020-10-29 DIAGNOSIS — M21.619 HALLUX VALGUS WITH BUNIONS, UNSPECIFIED LATERALITY: ICD-10-CM

## 2020-10-29 DIAGNOSIS — M20.10 HALLUX VALGUS WITH BUNIONS, UNSPECIFIED LATERALITY: ICD-10-CM

## 2020-10-29 DIAGNOSIS — M21.612 HALLUX VALGUS WITH BUNIONS OF LEFT FOOT: Primary | ICD-10-CM

## 2020-10-29 PROCEDURE — 99999 PR PBB SHADOW E&M-EST. PATIENT-LVL IV: CPT | Mod: PBBFAC,,, | Performed by: ORTHOPAEDIC SURGERY

## 2020-10-29 PROCEDURE — 29405 PR APPLY SHORT LEG CAST: ICD-10-PCS | Mod: 58,LT,S$GLB, | Performed by: ORTHOPAEDIC SURGERY

## 2020-10-29 PROCEDURE — 73630 X-RAY EXAM OF FOOT: CPT | Mod: TC,PO,LT

## 2020-10-29 PROCEDURE — 99024 PR POST-OP FOLLOW-UP VISIT: ICD-10-PCS | Mod: S$GLB,,, | Performed by: ORTHOPAEDIC SURGERY

## 2020-10-29 PROCEDURE — 99999 PR PBB SHADOW E&M-EST. PATIENT-LVL IV: ICD-10-PCS | Mod: PBBFAC,,, | Performed by: ORTHOPAEDIC SURGERY

## 2020-10-29 PROCEDURE — 29405 APPL SHORT LEG CAST: CPT | Mod: 58,LT,S$GLB, | Performed by: ORTHOPAEDIC SURGERY

## 2020-10-29 PROCEDURE — 73630 X-RAY EXAM OF FOOT: CPT | Mod: 26,LT,, | Performed by: RADIOLOGY

## 2020-10-29 PROCEDURE — 73630 XR FOOT COMPLETE 3 VIEW LEFT: ICD-10-PCS | Mod: 26,LT,, | Performed by: RADIOLOGY

## 2020-10-29 PROCEDURE — 99024 POSTOP FOLLOW-UP VISIT: CPT | Mod: S$GLB,,, | Performed by: ORTHOPAEDIC SURGERY

## 2020-10-29 NOTE — NURSING
Dr. Garibay ordered short leg cast to be applied to left lower extremity. Left short leg cast applied to extremity without any problems. Patient tolerated application of cast well. Patient able to move left toes freely, cap refill less than 3 seconds. Instructed patient on cast care to include non weight bearing to extremity and elevation of extremity. Cast is not able to get wet. If cast gets wet patient must call the office to come in for cast change. If it is after hours or on the weekend patient to go to ER to have cast removed. Patient to call for an appointment to have cast reapplied on next business day. Do not stick anything inside cast as it could open the skin causing a wound and/or infection. Continue to elevate affected extremity above the heart to allow for swelling to resolve. Patient to call the office if cast starts to rub or becomes loose on lower extremity. Patient stated understanding.

## 2020-10-29 NOTE — PROGRESS NOTES
Subjective:      Patient ID: Renata Bae is a 49 y.o. female.    Chief Complaint: Post-op Evaluation of the Left Foot (DOS 10/14/2020 1) Left bunionectomy and modified De [place] Santos distal soft tissue release 2) Left [id number] tarsometatarsal joint arthrodesis )    Doing very well today. She rates her pain as 1/10 today. DOS: 10/14/20  Social History     Occupational History    Not on file   Tobacco Use    Smoking status: Never Smoker    Smokeless tobacco: Never Used   Substance and Sexual Activity    Alcohol use: Yes     Alcohol/week: 1.0 standard drinks     Types: 1 Glasses of wine per week     Comment: occassional    Drug use: Never    Sexual activity: Yes     Partners: Male     Birth control/protection: OCP            Objective:    Ortho Exam     LLE: Neurovascularly intact, incisions healing well,  moderate swelling, sutures are intact.  No signs of infection. Good correction and alignment.       XRAYS: 3 views of left foot obtained and reviewed today reveal Good correction and alignment. Hardware is intact .     Assessment:          Encounter Diagnosis   Name Primary?    Hallux valgus with bunions of left foot Yes      Plan:       Short leg fiberglass cast applied. Non-weightbearing LLE. F/u 2 weeks xray out of plaster left foot.

## 2020-11-11 DIAGNOSIS — M21.612 HALLUX VALGUS WITH BUNIONS OF LEFT FOOT: Primary | ICD-10-CM

## 2020-11-11 DIAGNOSIS — M20.12 HALLUX VALGUS WITH BUNIONS OF LEFT FOOT: Primary | ICD-10-CM

## 2020-11-12 ENCOUNTER — OFFICE VISIT (OUTPATIENT)
Dept: ORTHOPEDICS | Facility: CLINIC | Age: 50
End: 2020-11-12
Payer: COMMERCIAL

## 2020-11-12 ENCOUNTER — HOSPITAL ENCOUNTER (OUTPATIENT)
Dept: RADIOLOGY | Facility: HOSPITAL | Age: 50
Discharge: HOME | End: 2020-11-12
Attending: ORTHOPAEDIC SURGERY
Payer: COMMERCIAL

## 2020-11-12 VITALS
HEART RATE: 100 BPM | SYSTOLIC BLOOD PRESSURE: 120 MMHG | BODY MASS INDEX: 24.8 KG/M2 | WEIGHT: 140 LBS | HEIGHT: 63 IN | DIASTOLIC BLOOD PRESSURE: 84 MMHG

## 2020-11-12 DIAGNOSIS — M21.612 HALLUX VALGUS WITH BUNIONS OF LEFT FOOT: Primary | ICD-10-CM

## 2020-11-12 DIAGNOSIS — M21.612 HALLUX VALGUS WITH BUNIONS OF LEFT FOOT: ICD-10-CM

## 2020-11-12 DIAGNOSIS — M20.12 HALLUX VALGUS WITH BUNIONS OF LEFT FOOT: ICD-10-CM

## 2020-11-12 DIAGNOSIS — M20.12 HALLUX VALGUS WITH BUNIONS OF LEFT FOOT: Primary | ICD-10-CM

## 2020-11-12 PROCEDURE — 99999 PR PBB SHADOW E&M-EST. PATIENT-LVL III: CPT | Mod: PBBFAC,,, | Performed by: ORTHOPAEDIC SURGERY

## 2020-11-12 PROCEDURE — 99024 PR POST-OP FOLLOW-UP VISIT: ICD-10-PCS | Mod: S$GLB,,, | Performed by: ORTHOPAEDIC SURGERY

## 2020-11-12 PROCEDURE — 97760 ORTHOTIC MGMT&TRAING 1ST ENC: CPT | Mod: S$GLB,,, | Performed by: ORTHOPAEDIC SURGERY

## 2020-11-12 PROCEDURE — 97760 PR ORTHOTIC MGMT&TRAINJ INITIAL ENC EA 15 MINS: ICD-10-PCS | Mod: S$GLB,,, | Performed by: ORTHOPAEDIC SURGERY

## 2020-11-12 PROCEDURE — 99024 POSTOP FOLLOW-UP VISIT: CPT | Mod: S$GLB,,, | Performed by: ORTHOPAEDIC SURGERY

## 2020-11-12 PROCEDURE — 99999 PR PBB SHADOW E&M-EST. PATIENT-LVL III: ICD-10-PCS | Mod: PBBFAC,,, | Performed by: ORTHOPAEDIC SURGERY

## 2020-11-12 PROCEDURE — 73630 XR FOOT COMPLETE 3 VIEW LEFT: ICD-10-PCS | Mod: 26,LT,, | Performed by: RADIOLOGY

## 2020-11-12 PROCEDURE — 73630 X-RAY EXAM OF FOOT: CPT | Mod: 26,LT,, | Performed by: RADIOLOGY

## 2020-11-12 PROCEDURE — 73630 X-RAY EXAM OF FOOT: CPT | Mod: TC,PO,LT

## 2020-11-12 NOTE — PROGRESS NOTES
Subjective:      Patient ID: Renata Bae is a 50 y.o. female.    Chief Complaint: Post-op Evaluation of the Left Foot (DOS 10/14/2020 1) Left bunionectomy and modified De [place] Santos distal soft tissue release 2) Left [id number] tarsometatarsal joint arthrodesis)    Doing very well today. She rates her pain as 0/10 today. DOS: 10/14/20  Social History     Occupational History    Not on file   Tobacco Use    Smoking status: Never Smoker    Smokeless tobacco: Never Used   Substance and Sexual Activity    Alcohol use: Yes     Alcohol/week: 1.0 standard drinks     Types: 1 Glasses of wine per week     Comment: occassional    Drug use: Never    Sexual activity: Yes     Partners: Male     Birth control/protection: OCP            Objective:    Ortho Exam     LLE: Neurovascularly intact, incisions healing well,  Mild  swelling,  No signs of infection. Good correction and alignment. non-tender to palpation   at the fusion site.       XRAYS: 3 views of left foot obtained and reviewed today reveal Good correction and alignment. Hardware is intact . There is interval progression of healing.      Assessment:          Encounter Diagnosis   Name Primary?    Hallux valgus with bunions of left foot Yes      Plan:       We performed a custom orthotic/brace adjustment, fitting and training with the patient today. The patient demonstrated understanding and proper care. This was performed for 15 minutes.  Short boot  was given.    F/u 3 weeks xray left foot.

## 2020-11-12 NOTE — NURSING
Dr. Garibay ordered left short leg cast to be removed. Cast removal explained to patient. Patient applied goggles. Left short leg cast intact. Left short leg cast removed without difficulty using the cast saw. Incisions intact. No skin irritation noted. Patient tolerated cast removal well.

## 2020-11-23 ENCOUNTER — TELEPHONE (OUTPATIENT)
Dept: ORTHOPEDICS | Facility: CLINIC | Age: 50
End: 2020-11-23

## 2020-11-23 DIAGNOSIS — M21.612 HALLUX VALGUS WITH BUNIONS OF LEFT FOOT: Primary | ICD-10-CM

## 2020-11-23 DIAGNOSIS — M20.12 HALLUX VALGUS WITH BUNIONS OF LEFT FOOT: Primary | ICD-10-CM

## 2020-11-23 NOTE — TELEPHONE ENCOUNTER
Called pt. Advised that Dr. Garibay will not be in clinic on 12/3/2020. Rescheduled appt to 12/8/2020. ThanksLara

## 2020-12-07 DIAGNOSIS — M20.12 HALLUX VALGUS WITH BUNIONS OF LEFT FOOT: Primary | ICD-10-CM

## 2020-12-07 DIAGNOSIS — M21.612 HALLUX VALGUS WITH BUNIONS OF LEFT FOOT: Primary | ICD-10-CM

## 2020-12-08 ENCOUNTER — HOSPITAL ENCOUNTER (OUTPATIENT)
Dept: RADIOLOGY | Facility: HOSPITAL | Age: 50
Discharge: HOME | End: 2020-12-08
Attending: ORTHOPAEDIC SURGERY
Payer: COMMERCIAL

## 2020-12-08 ENCOUNTER — OFFICE VISIT (OUTPATIENT)
Dept: ORTHOPEDICS | Facility: CLINIC | Age: 50
End: 2020-12-08
Payer: COMMERCIAL

## 2020-12-08 VITALS
HEART RATE: 99 BPM | DIASTOLIC BLOOD PRESSURE: 77 MMHG | SYSTOLIC BLOOD PRESSURE: 135 MMHG | BODY MASS INDEX: 24.8 KG/M2 | HEIGHT: 63 IN | WEIGHT: 140 LBS

## 2020-12-08 DIAGNOSIS — M21.612 HALLUX VALGUS WITH BUNIONS OF LEFT FOOT: Primary | ICD-10-CM

## 2020-12-08 DIAGNOSIS — M21.612 HALLUX VALGUS WITH BUNIONS OF LEFT FOOT: ICD-10-CM

## 2020-12-08 DIAGNOSIS — M20.12 HALLUX VALGUS WITH BUNIONS OF LEFT FOOT: Primary | ICD-10-CM

## 2020-12-08 DIAGNOSIS — M20.12 HALLUX VALGUS WITH BUNIONS OF LEFT FOOT: ICD-10-CM

## 2020-12-08 PROCEDURE — 73630 XR FOOT COMPLETE 3 VIEW LEFT: ICD-10-PCS | Mod: 26,LT,, | Performed by: RADIOLOGY

## 2020-12-08 PROCEDURE — 99024 PR POST-OP FOLLOW-UP VISIT: ICD-10-PCS | Mod: S$GLB,,, | Performed by: ORTHOPAEDIC SURGERY

## 2020-12-08 PROCEDURE — 73630 X-RAY EXAM OF FOOT: CPT | Mod: TC,PO,LT

## 2020-12-08 PROCEDURE — 73630 X-RAY EXAM OF FOOT: CPT | Mod: 26,LT,, | Performed by: RADIOLOGY

## 2020-12-08 PROCEDURE — 99999 PR PBB SHADOW E&M-EST. PATIENT-LVL III: ICD-10-PCS | Mod: PBBFAC,,, | Performed by: ORTHOPAEDIC SURGERY

## 2020-12-08 PROCEDURE — 99999 PR PBB SHADOW E&M-EST. PATIENT-LVL III: CPT | Mod: PBBFAC,,, | Performed by: ORTHOPAEDIC SURGERY

## 2020-12-08 PROCEDURE — 99024 POSTOP FOLLOW-UP VISIT: CPT | Mod: S$GLB,,, | Performed by: ORTHOPAEDIC SURGERY

## 2020-12-08 NOTE — PROGRESS NOTES
Subjective:      Patient ID: Renata Bae is a 50 y.o. female.    Chief Complaint: Post-op Evaluation of the Left Foot (DOS 10/14/2020)    Doing very well today. She rates her pain as 0/10 today. DOS: 10/14/20.     Social History     Occupational History    Not on file   Tobacco Use    Smoking status: Never Smoker    Smokeless tobacco: Never Used   Substance and Sexual Activity    Alcohol use: Yes     Alcohol/week: 1.0 standard drinks     Types: 1 Glasses of wine per week     Comment: occassional    Drug use: Never    Sexual activity: Yes     Partners: Male     Birth control/protection: OCP            Objective:    Ortho Exam     LLE: Neurovascularly intact, incisions healing well,  minimal swelling,  No signs of infection. Good correction and alignment. non-tender to palpation   at the fusion site. NROM of the foot and the ankle.       XRAYS: 3 views of left foot obtained and reviewed today reveal Good correction and alignment. Hardware is intact . There is interval progression of healing.      Assessment:          S/p left foot bunionectomy and lapidus fusion  Plan:       Weight bearing as tolerated in regular shoe. Continue toe spacer.  F/u 4 weeks xray left foot.

## 2021-01-08 DIAGNOSIS — M21.612 HALLUX VALGUS WITH BUNIONS OF LEFT FOOT: Primary | ICD-10-CM

## 2021-01-08 DIAGNOSIS — M20.12 HALLUX VALGUS WITH BUNIONS OF LEFT FOOT: Primary | ICD-10-CM

## 2021-01-12 ENCOUNTER — HOSPITAL ENCOUNTER (OUTPATIENT)
Dept: RADIOLOGY | Facility: HOSPITAL | Age: 51
Discharge: HOME | End: 2021-01-12
Attending: ORTHOPAEDIC SURGERY
Payer: COMMERCIAL

## 2021-01-12 ENCOUNTER — OFFICE VISIT (OUTPATIENT)
Dept: ORTHOPEDICS | Facility: CLINIC | Age: 51
End: 2021-01-12
Payer: COMMERCIAL

## 2021-01-12 VITALS
WEIGHT: 139 LBS | HEIGHT: 63 IN | DIASTOLIC BLOOD PRESSURE: 80 MMHG | BODY MASS INDEX: 24.63 KG/M2 | HEART RATE: 90 BPM | SYSTOLIC BLOOD PRESSURE: 147 MMHG

## 2021-01-12 DIAGNOSIS — M20.12 HALLUX VALGUS WITH BUNIONS OF LEFT FOOT: Primary | ICD-10-CM

## 2021-01-12 DIAGNOSIS — M21.612 HALLUX VALGUS WITH BUNIONS OF LEFT FOOT: ICD-10-CM

## 2021-01-12 DIAGNOSIS — M21.612 HALLUX VALGUS WITH BUNIONS OF LEFT FOOT: Primary | ICD-10-CM

## 2021-01-12 DIAGNOSIS — M20.12 HALLUX VALGUS WITH BUNIONS OF LEFT FOOT: ICD-10-CM

## 2021-01-12 PROCEDURE — 73630 X-RAY EXAM OF FOOT: CPT | Mod: 26,LT,, | Performed by: RADIOLOGY

## 2021-01-12 PROCEDURE — 99999 PR PBB SHADOW E&M-EST. PATIENT-LVL III: CPT | Mod: PBBFAC,,, | Performed by: ORTHOPAEDIC SURGERY

## 2021-01-12 PROCEDURE — 99024 POSTOP FOLLOW-UP VISIT: CPT | Mod: S$GLB,,, | Performed by: ORTHOPAEDIC SURGERY

## 2021-01-12 PROCEDURE — 73630 X-RAY EXAM OF FOOT: CPT | Mod: TC,PO,LT

## 2021-01-12 PROCEDURE — 99999 PR PBB SHADOW E&M-EST. PATIENT-LVL III: ICD-10-PCS | Mod: PBBFAC,,, | Performed by: ORTHOPAEDIC SURGERY

## 2021-01-12 PROCEDURE — 99024 PR POST-OP FOLLOW-UP VISIT: ICD-10-PCS | Mod: S$GLB,,, | Performed by: ORTHOPAEDIC SURGERY

## 2021-01-12 PROCEDURE — 73630 XR FOOT COMPLETE 3 VIEW LEFT: ICD-10-PCS | Mod: 26,LT,, | Performed by: RADIOLOGY

## 2022-04-30 NOTE — ADDENDUM NOTE
Addended by: CAROLINA MCDONNELL on: 9/30/2020 03:02 PM     Modules accepted: Orders, SmartSet    
No

## 2023-03-17 ENCOUNTER — PATIENT MESSAGE (OUTPATIENT)
Dept: RESEARCH | Facility: HOSPITAL | Age: 53
End: 2023-03-17
Payer: COMMERCIAL

## (undated) DEVICE — ELECTRODE REM PLYHSV RETURN 9

## (undated) DEVICE — DYNAFORCE PLATE STERILE INSTRUMENT KIT

## (undated) DEVICE — ALCOHOL 70% ISOP RUBBING 4OZ

## (undated) DEVICE — DRAPE STERI-DRAPE 1000 17X11IN

## (undated) DEVICE — K-WIRE TRCR PT1.6MM DIA 150MM
Type: IMPLANTABLE DEVICE | Site: FOOT | Status: NON-FUNCTIONAL
Removed: 2020-10-14

## (undated) DEVICE — SEE MEDLINE ITEM 157116

## (undated) DEVICE — Device

## (undated) DEVICE — DRAPE STERI U-SHAPED 47X51IN

## (undated) DEVICE — SUT BONE WAX 2.5 GRMS 12/BX

## (undated) DEVICE — TOURNIQUET SB QC DP 18X4IN

## (undated) DEVICE — DRESSING XEROFORM FOIL PK 1X8

## (undated) DEVICE — PENCIL ROCKER SWITCH 10FT CORD

## (undated) DEVICE — WIRE-K 20MM X 150MM.
Type: IMPLANTABLE DEVICE | Site: FOOT | Status: NON-FUNCTIONAL
Removed: 2020-10-14

## (undated) DEVICE — BLADE SURG #15 CARBON STEEL

## (undated) DEVICE — SEE MEDLINE ITEM 157131

## (undated) DEVICE — UNDERGLOVE BIOGEL PI SZ 6.5 LF

## (undated) DEVICE — SEE MEDLINE ITEM 146308

## (undated) DEVICE — SEE MEDLINE ITEM 146298

## (undated) DEVICE — SEE MEDLINE ITEM 146313

## (undated) DEVICE — SUT ETHILON 3-0 PS2 18 BLK

## (undated) DEVICE — NDL 22GA X1 1/2 REG BEVEL

## (undated) DEVICE — SEE MEDLINE ITEM 157117

## (undated) DEVICE — SUT MONOCRYL 0 CT-1 UND MON

## (undated) DEVICE — GLOVE SURGEONS ULTRA TOUCH 6.5

## (undated) DEVICE — SUT MONOCRYL 3-0 SH U/D

## (undated) DEVICE — DRAPE PLASTIC U 60X72

## (undated) DEVICE — GAUZE SPONGE 4X4 12PLY

## (undated) DEVICE — SEE MEDLINE ITEM 152622

## (undated) DEVICE — BANDAGE ESMARK LATEX FREE 4INX

## (undated) DEVICE — GAUZE SPONGE 8X4 12 PLY

## (undated) DEVICE — BURR OVAL DMND 4.0MM LONG.

## (undated) DEVICE — DURAPREP SURG SCRUB 26ML

## (undated) DEVICE — SUT MONOCRYL 2-0 S UND

## (undated) DEVICE — SEE L#120831